# Patient Record
Sex: MALE | Race: WHITE | URBAN - METROPOLITAN AREA
[De-identification: names, ages, dates, MRNs, and addresses within clinical notes are randomized per-mention and may not be internally consistent; named-entity substitution may affect disease eponyms.]

---

## 2023-07-11 ENCOUNTER — HOSPITAL ENCOUNTER (EMERGENCY)
Age: 33
Discharge: HOME | End: 2023-07-11
Payer: COMMERCIAL

## 2023-07-11 VITALS
WEIGHT: 155.2 LBS | BODY MASS INDEX: 19.93 KG/M2 | DIASTOLIC BLOOD PRESSURE: 76 MMHG | OXYGEN SATURATION: 98 % | SYSTOLIC BLOOD PRESSURE: 121 MMHG | TEMPERATURE: 98.5 F | RESPIRATION RATE: 18 BRPM | HEART RATE: 72 BPM

## 2023-07-11 DIAGNOSIS — K08.89 PAIN, DENTAL: Primary | ICD-10-CM

## 2023-07-11 PROCEDURE — 99282 EMERGENCY DEPT VISIT SF MDM: CPT

## 2023-07-11 ASSESSMENT — ENCOUNTER SYMPTOMS
VOMITING: 0
COUGH: 0
DIARRHEA: 0
ABDOMINAL PAIN: 0
WHEEZING: 0
NAUSEA: 0
RHINORRHEA: 0
SHORTNESS OF BREATH: 0

## 2023-07-11 ASSESSMENT — PAIN SCALES - GENERAL: PAINLEVEL_OUTOF10: 4

## 2023-07-11 ASSESSMENT — PAIN - FUNCTIONAL ASSESSMENT: PAIN_FUNCTIONAL_ASSESSMENT: 0-10

## 2023-07-11 NOTE — ED PROVIDER NOTES
Thanh Khloe        Pt Name: Antonette Adames  MRN: [id number]  [address] 1990  Date of evaluation: 7/11/2023  Provider: Dany Minor PA-C  PCP: No primary care provider on file. Note Started: 2:25 PM EDT 7/11/23      COURT. I have evaluated this patient. CHIEF COMPLAINT       Chief Complaint   Patient presents with    Dental Pain     Lower, left dental pain; was scheduled for root canal today, but was canceled. Finished Amoxicillin. Wants referral to new dentist.        HISTORY OF PRESENT ILLNESS: 1 or more Elements     History From: patient   Limitations to history : None    Antonette Adames is a 27 y.o. male who presents for dental referral.  Patient states that he has a tooth that needs a root canal but states that [address] canceled his procedure today and they cannot get him in for another month. Patient denies any significant dental pain at this time, states that he just completed a 14-day course of amoxicillin yesterday. States that he cannot wait that long and he is wanting additional resources for closer follow-up. He has no other complaints or concerns at this time. Nursing Notes were all reviewed and agreed with or any disagreements were addressed in the HPI. REVIEW OF SYSTEMS :      Review of Systems   Constitutional:  Negative for appetite change, chills and fever. HENT:  Positive for dental problem. Negative for congestion and rhinorrhea. Respiratory:  Negative for cough, shortness of breath and wheezing. Cardiovascular:  Negative for chest pain. Gastrointestinal:  Negative for abdominal pain, diarrhea, nausea and vomiting. Genitourinary:  Negative for difficulty urinating, dysuria and hematuria. Musculoskeletal:  Negative for neck pain and neck stiffness. Skin:  Negative for rash. Neurological:  Negative for headaches. Positives and Pertinent negatives as per HPI.      SURGICAL HISTORY

## 2023-07-11 NOTE — ED NOTES
Patient discharged  to home in stable condition via private car. Discharge instructions reviewed with patient. Patient verbalized understanding. All belongings in tow including discharge paperwork.         Armaan Blount RN  07/11/23 [id number]